# Patient Record
Sex: FEMALE | Race: WHITE | NOT HISPANIC OR LATINO | Employment: UNEMPLOYED | URBAN - METROPOLITAN AREA
[De-identification: names, ages, dates, MRNs, and addresses within clinical notes are randomized per-mention and may not be internally consistent; named-entity substitution may affect disease eponyms.]

---

## 2023-02-28 LAB
BASOPHILS (10*3/UL) IN BLOOD BY AUTOMATED COUNT: 0.09 X10E9/L (ref 0–0.1)
BASOPHILS/100 LEUKOCYTES IN BLOOD BY AUTOMATED COUNT: 2.6 % (ref 0–2)
EOSINOPHILS (10*3/UL) IN BLOOD BY AUTOMATED COUNT: 0.16 X10E9/L (ref 0–0.7)
EOSINOPHILS/100 LEUKOCYTES IN BLOOD BY AUTOMATED COUNT: 4.6 % (ref 0–6)
ERYTHROCYTE DISTRIBUTION WIDTH (RATIO) BY AUTOMATED COUNT: 13.2 % (ref 11.5–14.5)
ERYTHROCYTE MEAN CORPUSCULAR HEMOGLOBIN CONCENTRATION (G/DL) BY AUTOMATED: 33.6 G/DL (ref 32–36)
ERYTHROCYTE MEAN CORPUSCULAR VOLUME (FL) BY AUTOMATED COUNT: 108 FL (ref 80–100)
ERYTHROCYTES (10*6/UL) IN BLOOD BY AUTOMATED COUNT: 4.08 X10E12/L (ref 4–5.2)
HEMATOCRIT (%) IN BLOOD BY AUTOMATED COUNT: 44 % (ref 36–46)
HEMOGLOBIN (G/DL) IN BLOOD: 14.8 G/DL (ref 12–16)
IMMATURE GRANULOCYTES/100 LEUKOCYTES IN BLOOD BY AUTOMATED COUNT: 0.3 % (ref 0–0.9)
LEUKOCYTES (10*3/UL) IN BLOOD BY AUTOMATED COUNT: 3.5 X10E9/L (ref 4.4–11.3)
LYMPHOCYTES (10*3/UL) IN BLOOD BY AUTOMATED COUNT: 1.21 X10E9/L (ref 1.2–4.8)
LYMPHOCYTES/100 LEUKOCYTES IN BLOOD BY AUTOMATED COUNT: 34.8 % (ref 13–44)
MONOCYTES (10*3/UL) IN BLOOD BY AUTOMATED COUNT: 0.43 X10E9/L (ref 0.1–1)
MONOCYTES/100 LEUKOCYTES IN BLOOD BY AUTOMATED COUNT: 12.4 % (ref 2–10)
NEUTROPHILS (10*3/UL) IN BLOOD BY AUTOMATED COUNT: 1.58 X10E9/L (ref 1.2–7.7)
NEUTROPHILS/100 LEUKOCYTES IN BLOOD BY AUTOMATED COUNT: 45.3 % (ref 40–80)
PLATELETS (10*3/UL) IN BLOOD AUTOMATED COUNT: 249 X10E9/L (ref 150–450)

## 2023-10-10 ENCOUNTER — TELEPHONE (OUTPATIENT)
Dept: HEMATOLOGY/ONCOLOGY | Facility: HOSPITAL | Age: 58
End: 2023-10-10
Payer: COMMERCIAL

## 2023-10-10 DIAGNOSIS — D70.0 CONGENITAL NEUTROPENIA (MULTI): Primary | ICD-10-CM

## 2023-10-10 NOTE — TELEPHONE ENCOUNTER
Tutu from [facility] calling regarding Zarxio 480 mcg.  This medication is on  backorder.  Do we have any alternatives.    They suggest Nivestym which is the same strength but her insurance will require prior authorization.

## 2023-10-13 NOTE — TELEPHONE ENCOUNTER
Per provider OK for Neupogen.  Call placed to specialty pharmacy.  Per specialty pharmacy, they can provide Neupogen however will need new orders.  Will forward to provider.

## 2023-10-16 ENCOUNTER — TELEPHONE (OUTPATIENT)
Dept: HEMATOLOGY/ONCOLOGY | Facility: HOSPITAL | Age: 58
End: 2023-10-16
Payer: COMMERCIAL

## 2023-10-18 ENCOUNTER — DOCUMENTATION (OUTPATIENT)
Dept: HEMATOLOGY/ONCOLOGY | Facility: CLINIC | Age: 58
End: 2023-10-18
Payer: COMMERCIAL

## 2023-10-18 DIAGNOSIS — D70.0 CONGENITAL NEUTROPENIA (MULTI): ICD-10-CM

## 2023-10-18 NOTE — TELEPHONE ENCOUNTER
Patient calling [facility] waiting for prescription since Zarxio is back ordered.  Reference [id number] phone [phone]    Covering RN and I looked in chart and don't see prescription was sent.

## 2023-10-20 ENCOUNTER — TELEPHONE (OUTPATIENT)
Dept: HEMATOLOGY/ONCOLOGY | Facility: CLINIC | Age: 58
End: 2023-10-20
Payer: COMMERCIAL

## 2023-10-20 DIAGNOSIS — D70.0 CONGENITAL NEUTROPENIA (MULTI): ICD-10-CM

## 2023-10-20 RX ORDER — FILGRASTIM-AAFI 480 UG/.8ML
480 INJECTION, SOLUTION SUBCUTANEOUS
Qty: 4.8 ML | Refills: 0 | Status: SHIPPED | OUTPATIENT
Start: 2023-10-20 | End: 2023-12-28 | Stop reason: SDUPTHER

## 2023-10-24 ENCOUNTER — TELEPHONE (OUTPATIENT)
Dept: HEMATOLOGY/ONCOLOGY | Facility: CLINIC | Age: 58
End: 2023-10-24
Payer: COMMERCIAL

## 2023-10-26 NOTE — TELEPHONE ENCOUNTER
"Call placed to [facility] x 2 11/25.  Unable to speak to a representative after extended wait time.      Call placed to [facility] 10/26.  Per [facility] need to contact patient's \"plan\" at [phone] to authorize.    "

## 2023-10-26 NOTE — TELEPHONE ENCOUNTER
Approval obtained from patient's insurance company good from 9/26/23 - 4/23/24 ID#[id number].   Call returned again to [facility] to notify.  They confirmed the RX is able to be sent and will send today.  Call returned to patient.  Patient notified of approval.  Patient will call [facility] if not received by tomorrow.  Call back instructions reviewed.  Patient verbalized understanding.

## 2023-11-16 RX ORDER — LEVOTHYROXINE SODIUM 137 UG/1
TABLET ORAL
COMMUNITY
Start: 2023-08-27

## 2023-11-16 RX ORDER — NIRMATRELVIR AND RITONAVIR 300-100 MG
KIT ORAL
COMMUNITY
Start: 2023-09-08

## 2023-11-16 RX ORDER — NEBIVOLOL 10 MG/1
TABLET ORAL
COMMUNITY
Start: 2023-08-27

## 2023-11-16 RX ORDER — COVID-19 ANTIGEN TEST
KIT MISCELLANEOUS
COMMUNITY
Start: 2023-10-13

## 2023-11-16 RX ORDER — NEBIVOLOL HYDROCHLORIDE 5 MG/1
TABLET ORAL
COMMUNITY
Start: 2023-02-06 | End: 2024-02-29 | Stop reason: WASHOUT

## 2023-11-16 RX ORDER — ALBUTEROL SULFATE 90 UG/1
AEROSOL, METERED RESPIRATORY (INHALATION)
COMMUNITY
Start: 2023-08-27

## 2023-11-28 ENCOUNTER — APPOINTMENT (OUTPATIENT)
Dept: LAB | Facility: CLINIC | Age: 58
End: 2023-11-28
Payer: COMMERCIAL

## 2023-11-28 ENCOUNTER — TELEPHONE (OUTPATIENT)
Dept: HEMATOLOGY/ONCOLOGY | Facility: CLINIC | Age: 58
End: 2023-11-28
Payer: COMMERCIAL

## 2023-11-28 ENCOUNTER — OFFICE VISIT (OUTPATIENT)
Dept: HEMATOLOGY/ONCOLOGY | Facility: CLINIC | Age: 58
End: 2023-11-28
Payer: COMMERCIAL

## 2023-11-28 VITALS
TEMPERATURE: 97.3 F | WEIGHT: 193.12 LBS | DIASTOLIC BLOOD PRESSURE: 88 MMHG | HEIGHT: 67 IN | OXYGEN SATURATION: 100 % | HEART RATE: 80 BPM | SYSTOLIC BLOOD PRESSURE: 152 MMHG | BODY MASS INDEX: 30.31 KG/M2

## 2023-11-28 DIAGNOSIS — D70.0 CONGENITAL NEUTROPENIA (MULTI): ICD-10-CM

## 2023-11-28 LAB
BASOPHILS # BLD AUTO: 0.08 X10*3/UL (ref 0–0.1)
BASOPHILS NFR BLD AUTO: 2.3 %
EOSINOPHIL # BLD AUTO: 0.21 X10*3/UL (ref 0–0.7)
EOSINOPHIL NFR BLD AUTO: 6.1 %
ERYTHROCYTE [DISTWIDTH] IN BLOOD BY AUTOMATED COUNT: 12.4 % (ref 11.5–14.5)
HCT VFR BLD AUTO: 45.4 % (ref 36–46)
HGB BLD-MCNC: 15.3 G/DL (ref 12–16)
IMM GRANULOCYTES # BLD AUTO: 0.01 X10*3/UL (ref 0–0.7)
IMM GRANULOCYTES NFR BLD AUTO: 0.3 % (ref 0–0.9)
LYMPHOCYTES # BLD AUTO: 1.33 X10*3/UL (ref 1.2–4.8)
LYMPHOCYTES NFR BLD AUTO: 38.6 %
MCH RBC QN AUTO: 36.1 PG (ref 26–34)
MCHC RBC AUTO-ENTMCNC: 33.7 G/DL (ref 32–36)
MCV RBC AUTO: 107 FL (ref 80–100)
MONOCYTES # BLD AUTO: 0.58 X10*3/UL (ref 0.1–1)
MONOCYTES NFR BLD AUTO: 16.8 %
NEUTROPHILS # BLD AUTO: 1.24 X10*3/UL (ref 1.2–7.7)
NEUTROPHILS NFR BLD AUTO: 35.9 %
NRBC BLD-RTO: ABNORMAL /100{WBCS}
PLATELET # BLD AUTO: 279 X10*3/UL (ref 150–450)
RBC # BLD AUTO: 4.24 X10*6/UL (ref 4–5.2)
WBC # BLD AUTO: 3.5 X10*3/UL (ref 4.4–11.3)

## 2023-11-28 PROCEDURE — 99214 OFFICE O/P EST MOD 30 MIN: CPT | Performed by: INTERNAL MEDICINE

## 2023-11-28 PROCEDURE — 36415 COLL VENOUS BLD VENIPUNCTURE: CPT | Performed by: INTERNAL MEDICINE

## 2023-11-28 PROCEDURE — 85025 COMPLETE CBC W/AUTO DIFF WBC: CPT | Performed by: INTERNAL MEDICINE

## 2023-11-28 ASSESSMENT — ENCOUNTER SYMPTOMS
LOSS OF SENSATION IN FEET: 0
OCCASIONAL FEELINGS OF UNSTEADINESS: 0
DEPRESSION: 0

## 2023-11-28 ASSESSMENT — PATIENT HEALTH QUESTIONNAIRE - PHQ9
SUM OF ALL RESPONSES TO PHQ9 QUESTIONS 1 AND 2: 0
1. LITTLE INTEREST OR PLEASURE IN DOING THINGS: NOT AT ALL
2. FEELING DOWN, DEPRESSED OR HOPELESS: NOT AT ALL

## 2023-11-28 ASSESSMENT — PAIN SCALES - GENERAL: PAINLEVEL: 0-NO PAIN

## 2023-11-28 NOTE — PROGRESS NOTES
Patient for follow up in 12 weeks with labs same day.  Patient to continue self injecting GCSF.  Patient independently ambulatory off unit in NAD and without complaints.  Gait steady.  Call back instructions reviewed.  Patient verbalized understanding.

## 2023-11-28 NOTE — PROGRESS NOTES
Patient ID: Lorena Mojica is a 58 y.o. female.  Referring Physician: No referring provider defined for this encounter.  Primary Care Provider: Matt Collins DO            Patient Instructions:       G-CSF 480mcg SQ Q 2 wks /continue self injection  RTC 12 wks  CBC,         ASSESSMENT, PROBLEM LIST, DECISION MAKING, PLAN.  1.  Chronic idiopathic neutropenia.       a.  Initial diagnosis in 2005. At that time her baseline ANC was around 300,   Extensive workup including bone marrow biopsy x2 all negative.At [facility], bone marrow biopsy results are not available    Patient was getting periodic pneumonia and other infections so she was started on Neupogen every 2 weeks and has remained stable  Patient was evaluated by Ricardo Garcia  at [facility] in November 2016 and has recommended repeat bone marrow aspirate and biopsy  b.  The patient is maintained on chronic Neupogen injection at 2-week interval.     PAST MEDICAL HISTORY:    1.  Chronic idiopathic neutropenia.  2.  Depression.  3.  Graves disease status post radioactive iodine ablation.  4.  Hypothyroidism due to radioactive iodine ablation.  5.  Hypertension.  6.  History of tubal ligation and eye surgeries.         INTERVAL HISTORY:    Patient returns today for follow-up on chronic neutropenia either idiopathic versus related to radiation therapy for hyperthyroidism   Patient was getting periodic pneumonia so she has been started on Neupogen every 2 weeks and has been doing fairly well         PHYSICAL EXAMINATION:    General: Conscious, alert, oriented x3, not in acute distress.  HEENT:    No icterus.    Chest:Bilateral symmetrical,     CVS:  S1, S2.    Abdomen:  Soft, no palpable mass   Extremities: No clubbing, cyanosis,     Skin: No petechial rash.                Assessment, Plan and Orders:       1.  Chronic idiopathic neutropenia.   Patient has been on 480 mcg of Neupogen at 2 weeks' interval. 2 attempts at stopping  Neupogen Or decreasing frequency over the years have resulted in severe drop in ANC and clinical problem with pneumonia and other infections.     Continue G-CSF  every 2 weeks, self injection       pt was adv to call before any invasive procedure such as dental work, colonoscopy or any other procedure and will get additional neupogen to make sure her ANC is >1000    Patient is up-to-date on her preventive vaccination including pneumonia shot shingles shots, flu shot, COVID shots and has been followed by primary care physician for periodic vaccination.        Patient has not had any recent hospital admission or any major infections            LABS:  Office Visit on 11/28/2023   Component Date Value Ref Range Status    WBC 11/28/2023 3.5 (L)  4.4 - 11.3 x10*3/uL Final    nRBC 11/28/2023    Final    RBC 11/28/2023 4.24  4.00 - 5.20 x10*6/uL Final    Hemoglobin 11/28/2023 15.3  12.0 - 16.0 g/dL Final    Hematocrit 11/28/2023 45.4  36.0 - 46.0 % Final    MCV 11/28/2023 107 (H)  80 - 100 fL Final    MCH 11/28/2023 36.1 (H)  26.0 - 34.0 pg Final    MCHC 11/28/2023 33.7  32.0 - 36.0 g/dL Final    RDW 11/28/2023 12.4  11.5 - 14.5 % Final    Platelets 11/28/2023 279  150 - 450 x10*3/uL Final    Neutrophils % 11/28/2023 35.9  40.0 - 80.0 % Final    Immature Granulocytes %, Automated 11/28/2023 0.3  0.0 - 0.9 % Final    Lymphocytes % 11/28/2023 38.6  13.0 - 44.0 % Final    Monocytes % 11/28/2023 16.8  2.0 - 10.0 % Final    Eosinophils % 11/28/2023 6.1  0.0 - 6.0 % Final    Basophils % 11/28/2023 2.3  0.0 - 2.0 % Final    Neutrophils Absolute 11/28/2023 1.24  1.20 - 7.70 x10*3/uL Final    Immature Granulocytes Absolute, Au* 11/28/2023 0.01  0.00 - 0.70 x10*3/uL Final    Lymphocytes Absolute 11/28/2023 1.33  1.20 - 4.80 x10*3/uL Final    Monocytes Absolute 11/28/2023 0.58  0.10 - 1.00 x10*3/uL Final    Eosinophils Absolute 11/28/2023 0.21  0.00 - 0.70 x10*3/uL Final    Basophils Absolute 11/28/2023 0.08  0.00 - 0.10 x10*3/uL  "Final       IMAGING:  No results found.    VITALS: BSA: 2.03 meters squared /88   Pulse 80   Temp 36.3 °C (97.3 °F) (Temporal)   Ht 1.7 m (5' 6.93\")   Wt 87.6 kg (193 lb 2 oz)   SpO2 100%   BMI 30.31 kg/m²     Current Outpatient Medications   Medication Sig Dispense Refill    albuterol 90 mcg/actuation inhaler       Bystolic 5 mg tablet       filgrastim-aafi (Nivestym) 480 mcg/0.8 mL Inject 0.8 mL (480 mcg) under the skin every 14 (fourteen) days for 6 doses. 4.8 mL 0    Flowflex COVID-19 Ag Home Test kit       nebivolol (Bystolic) 10 mg tablet       Paxlovid 300 mg (150 mg x 2)-100 mg tablet therapy pack TAKE 2 TABLETS (NIRMATRELVIR) AND TAKE 1 TABLET (RITONAVIR) BY MOUTH TWICE A DAY FOR 5 DAYS      Synthroid 137 mcg tablet       Zarxio 480 mcg/0.8 mL Inject 0.8 mL (480 mcg) under the skin once daily.       No current facility-administered medications for this visit.       ALLERGY: Patient has no known allergies.    SOCIAL HISTORY: She  has no history on file for alcohol use. She  has no history on file for drug use.    SOCIAL HISTORY:    The patient and her  have been  for 30 years and live in [place], [place].  Nonsmoker.  Rare social alcohol intake.  She is currently on disability.  She used to work as a .       FAMILY  HISTORY:    Father aged 83 has heart problems.  Mother aged 79 is a pancreatic cancer survivor.  3 siblings, 2 children, 2 sons all alive and well.         (1)  Medication reviewed in e-chart  Patient is monitored for medication toxicity  labs reviewed and interpreted independently, X rays independently reviewed  Notes from other physicians involved in care were reviewed    Charting was completed using voice recognition technology and may include unintended errors.    SIDNEY MCNAIR MD, DELMIS.    Reinier Davey Master Clinician in Hematology and Oncology  Medical Director, [facility] at [facility] " [place].  [place] office  Phone [phone]  Fax      [phone]  [facility] /[place].  Phone [phone]  Fax     [phone]

## 2023-11-28 NOTE — TELEPHONE ENCOUNTER
Received message from staff about question regarding assistance with a new medication.  Reached out to patient to clarify and assist.  Reports she had been on Zarxio and had assistance for that.  She was unsure of the name of the new one during the call, but indicated that it was office administered.  SW reviewed that our financial Navigator, Clemencia, may be able to assist if this is office administered.  She reported that she tried to get assistance, but was told the office had to do it.  Upon further review, it is not clear to SW at this time for certain as to home or clinic-administered as it appears [facility] may be filling it.  [employer] does advertise a co-pay card - the office completes forms for reimbursement if this is administered on site, otherwise, there is an option for pharmacy, and an option for the patient to create a portal.  Reached out to both patient and FN and provided info on patient registering for the portal.  SW will await further update from patient.

## 2023-12-28 ENCOUNTER — TELEPHONE (OUTPATIENT)
Dept: HEMATOLOGY/ONCOLOGY | Facility: CLINIC | Age: 58
End: 2023-12-28
Payer: COMMERCIAL

## 2023-12-28 DIAGNOSIS — D70.0 CONGENITAL NEUTROPENIA (MULTI): ICD-10-CM

## 2023-12-28 RX ORDER — FILGRASTIM-AAFI 480 UG/.8ML
480 INJECTION, SOLUTION SUBCUTANEOUS
Qty: 0.8 ML | Refills: 5 | Status: SHIPPED | OUTPATIENT
Start: 2023-12-28 | End: 2024-03-11 | Stop reason: SDUPTHER

## 2023-12-28 NOTE — TELEPHONE ENCOUNTER
[facility] requesting refill on Nivestym 480 mcg/0.8 ml PFS dosage 480 mcg route subcutaneously Frequency Daily Inject 0.8 ML (480 mcg) under the skin every 14 days for 6 doses (discard unused portion)

## 2024-01-22 ENCOUNTER — TELEPHONE (OUTPATIENT)
Dept: HEMATOLOGY/ONCOLOGY | Facility: CLINIC | Age: 59
End: 2024-01-22
Payer: COMMERCIAL

## 2024-01-22 NOTE — TELEPHONE ENCOUNTER
[facility] calling to say they received prescription but are requesting 2 syringes for 28 day supply.      Please call [phone] reference number [id number] for any questions.

## 2024-01-23 NOTE — TELEPHONE ENCOUNTER
Verbal order per Dr. Natalia bauman to dispense 2 syringes for a 28 day supply on Nivestym RX.  Call placed to direct doctor line at [phone], reference #[id number].  Updated RX with Marco Antonio pharmacist for Nivestym 480mcg to inject every 14 days dispense 2 syringes for 28 day supply with 2 refills (total of 3 mos supply).

## 2024-02-29 PROBLEM — J30.1 ALLERGIC RHINITIS DUE TO POLLEN: Status: ACTIVE | Noted: 2021-11-09

## 2024-02-29 PROBLEM — E03.4 ATROPHY OF THYROID (ACQUIRED): Status: ACTIVE | Noted: 2021-11-09

## 2024-02-29 PROBLEM — M25.511 ACUTE PAIN OF RIGHT SHOULDER: Status: ACTIVE | Noted: 2022-04-12
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
## 2024-02-29 PROBLEM — I10 ESSENTIAL (PRIMARY) HYPERTENSION: Status: ACTIVE | Noted: 2021-11-09

## 2024-03-05 ENCOUNTER — OFFICE VISIT (OUTPATIENT)
Dept: HEMATOLOGY/ONCOLOGY | Facility: CLINIC | Age: 59
End: 2024-03-05
Payer: COMMERCIAL

## 2024-03-05 ENCOUNTER — LAB (OUTPATIENT)
Dept: LAB | Facility: CLINIC | Age: 59
End: 2024-03-05
Payer: COMMERCIAL

## 2024-03-05 VITALS
BODY MASS INDEX: 31.19 KG/M2 | OXYGEN SATURATION: 96 % | SYSTOLIC BLOOD PRESSURE: 144 MMHG | WEIGHT: 198.75 LBS | RESPIRATION RATE: 16 BRPM | TEMPERATURE: 97 F | HEART RATE: 81 BPM | DIASTOLIC BLOOD PRESSURE: 89 MMHG

## 2024-03-05 DIAGNOSIS — D70.0 CONGENITAL NEUTROPENIA (MULTI): ICD-10-CM

## 2024-03-05 LAB
BASOPHILS # BLD AUTO: 0.07 X10*3/UL (ref 0–0.1)
BASOPHILS NFR BLD AUTO: 1.3 %
EOSINOPHIL # BLD AUTO: 0.16 X10*3/UL (ref 0–0.7)
EOSINOPHIL NFR BLD AUTO: 3 %
ERYTHROCYTE [DISTWIDTH] IN BLOOD BY AUTOMATED COUNT: 12.5 % (ref 11.5–14.5)
HCT VFR BLD AUTO: 43.2 % (ref 36–46)
HGB BLD-MCNC: 14.8 G/DL (ref 12–16)
IMM GRANULOCYTES # BLD AUTO: 0.04 X10*3/UL (ref 0–0.7)
IMM GRANULOCYTES NFR BLD AUTO: 0.7 % (ref 0–0.9)
LYMPHOCYTES # BLD AUTO: 1.55 X10*3/UL (ref 1.2–4.8)
LYMPHOCYTES NFR BLD AUTO: 29 %
MCH RBC QN AUTO: 36.8 PG (ref 26–34)
MCHC RBC AUTO-ENTMCNC: 34.3 G/DL (ref 32–36)
MCV RBC AUTO: 108 FL (ref 80–100)
MONOCYTES # BLD AUTO: 0.76 X10*3/UL (ref 0.1–1)
MONOCYTES NFR BLD AUTO: 14.2 %
NEUTROPHILS # BLD AUTO: 2.76 X10*3/UL (ref 1.2–7.7)
NEUTROPHILS NFR BLD AUTO: 51.8 %
NRBC BLD-RTO: ABNORMAL /100{WBCS}
PLATELET # BLD AUTO: 245 X10*3/UL (ref 150–450)
RBC # BLD AUTO: 4.02 X10*6/UL (ref 4–5.2)
WBC # BLD AUTO: 5.3 X10*3/UL (ref 4.4–11.3)

## 2024-03-05 PROCEDURE — 85025 COMPLETE CBC W/AUTO DIFF WBC: CPT

## 2024-03-05 PROCEDURE — 3077F SYST BP >= 140 MM HG: CPT | Performed by: INTERNAL MEDICINE

## 2024-03-05 PROCEDURE — 3079F DIAST BP 80-89 MM HG: CPT | Performed by: INTERNAL MEDICINE

## 2024-03-05 PROCEDURE — 99214 OFFICE O/P EST MOD 30 MIN: CPT | Performed by: INTERNAL MEDICINE

## 2024-03-05 PROCEDURE — 36415 COLL VENOUS BLD VENIPUNCTURE: CPT

## 2024-03-05 NOTE — PROGRESS NOTES
Patient ID: Lorena Mojica is a 58 y.o. female.  Referring Physician: Joao Fisher MD  [address]  [place] [address]  Primary Care Provider: Matt Collins DO    ORDERS & PATIENT INSTRUCTIONS:      G-CSF 480mcg SQ Q 2 wks /continue self injection  RTC 12 wks  CBC,         ASSESSMENT, PROBLEM LIST, DECISION MAKING, PLAN.  1.  Chronic idiopathic neutropenia.       a.  Initial diagnosis in 2005. At that time her baseline ANC was around 300,   Extensive workup including bone marrow biopsy x2 all negative.At [facility], bone marrow biopsy results are not available    Patient was getting periodic pneumonia and other infections so she was started on Neupogen every 2 weeks and has remained stable  Patient was evaluated by Ricardo Garcia  at [facility] in November 2016 and has recommended repeat bone marrow aspirate and biopsy  b.  The patient is maintained on chronic Neupogen injection at 2-week interval.     PAST MEDICAL HISTORY:    1.  Chronic idiopathic neutropenia.  2.  Depression.  3.  Graves disease status post radioactive iodine ablation.  4.  Hypothyroidism due to radioactive iodine ablation.  5.  Hypertension.  6.  History of tubal ligation and eye surgeries.         INTERVAL HISTORY:    Patient returns today for follow-up on chronic neutropenia either idiopathic versus related to radiation therapy for hyperthyroidism   Patient was getting periodic pneumonia so she has been started on Neupogen every 2 weeks and has been doing fairly well   Patient has not had any recent infection      PHYSICAL EXAMINATION:    General: Conscious, alert, oriented x3, not in acute distress.  HEENT:    No icterus.    Chest:Bilateral symmetrical,     CVS:  S1, S2.    Abdomen:  Soft, no palpable mass   Extremities: No clubbing, cyanosis,     Skin: No petechial rash.                Assessment, Plan and Orders:       1.  Chronic idiopathic neutropenia.   Patient has  "been on 480 mcg of Neupogen at 2 weeks' interval. 2 attempts at stopping Neupogen Or decreasing frequency over the years have resulted in severe drop in ANC and clinical problem with pneumonia and other infections.     Continue G-CSF  every 2 weeks, self injection       pt was adv to call before any invasive procedure such as dental work, colonoscopy or any other procedure and will get additional neupogen to make sure her ANC is >1000    Patient is up-to-date on her preventive vaccination including pneumonia shot shingles shots, flu shot, COVID shots and has been followed by primary care physician for periodic vaccination.        Patient has not had any recent hospital admission or any major infections            VITALS:   2.06 meters squared /89   Pulse 81   Temp 36.1 °C (97 °F)   Resp 16   Wt 90.2 kg (198 lb 11.9 oz)   SpO2 96%   BMI 31.19 kg/m²     LABS:    CBC:  Recent Labs     03/05/24  1335 11/28/23  1508 08/29/23  1030 05/30/23  0956 02/28/23  1031   WBC 5.3 3.5* 3.7* 2.3* CANCELED   HGB 14.8 15.3 14.0 13.7 CANCELED   HCT 43.2 45.4 41.0 41.2 CANCELED    279 247 219 CANCELED   * 107* 107* 109* CANCELED       CMP:  Recent Labs     08/25/20  1000 04/17/18  1000 01/16/18  1300 10/10/17  1310   * 141 139 140   K 4.3 4.2 4.2 4.2    106 106 108*   CO2 22 25 25 23   ANIONGAP 15 14 12 13   BUN 10 6 15 14   CREATININE 0.77 0.75 0.90 0.85     Recent Labs     08/25/20  1000 04/17/18  1000 01/16/18  1300 10/10/17  1310   ALBUMIN 4.0 4.1 4.5 4.3   ALKPHOS 63 74 65 65   ALT 34 26 16 13   AST 36 31 19 19   BILITOT 0.6 0.6 0.8 0.6       HEME/ENDO:No results for input(s): \"FERRITIN\", \"IRONSAT\", \"TSH\", \"GPQFEDXA40\", \"FOLATE\" in the last 97598 hours.     MICRO: No results for input(s): \"ESR\", \"CRP\", \"PROCAL\" in the last 53935 hours.  No results found for the last 90 days.        TUMOR MARKERS:  No results found for: \"LABCA2\", \"CEA\", \"\", \"PSA\", \"AFPTM\", \"HCGTM\", \"\" "             IMAGING:         No image results found.       Current Outpatient Medications   Medication Sig Dispense Refill    albuterol 90 mcg/actuation inhaler       filgrastim-aafi (Nivestym) 480 mcg/0.8 mL Inject 0.8 mL (480 mcg) under the skin every 14 (fourteen) days for 6 doses. 0.8 mL 5    nebivolol (Bystolic) 10 mg tablet       Synthroid 137 mcg tablet       Flowflex COVID-19 Ag Home Test kit       Paxlovid 300 mg (150 mg x 2)-100 mg tablet therapy pack TAKE 2 TABLETS (NIRMATRELVIR) AND TAKE 1 TABLET (RITONAVIR) BY MOUTH TWICE A DAY FOR 5 DAYS       No current facility-administered medications for this visit.                  SOCIAL HISTORY:    has no history on file for alcohol use.   has no history on file for drug use.,   Tobacco Use: Not on file       FAMILY HISTORY:  No family history on file.    ALLERGY:   Patient has no known allergies.        SOCIAL HISTORY:    The patient and her  have been  for 30 years and live in [place], [place].  Nonsmoker.  Rare social alcohol intake.  She is currently on disability.  She used to work as a .       FAMILY  HISTORY:    Father aged 83 has heart problems.  Mother aged 79 is a pancreatic cancer survivor.  3 siblings, 2 children, 2 sons all alive and well.        Medication reviewed in e-chart  Patient is monitored for medication toxicity  labs reviewed and interpreted independently, X rays independently reviewed  Notes from other physicians involved in care were reviewed    Charting was completed using voice recognition technology and may include unintended errors.    SIDNEY MCNAIR MD, DELMIS.    Reinier Davey Master Clinician in Hematology and Oncology  Medical Director, [facility] at [facility].  [place] office  Phone [phone]  Fax      [phone]  [facility] /[place].  Phone [phone]  Fax     [phone]

## 2024-03-05 NOTE — PROGRESS NOTES
Neupogen to continue as ordered.  No refills needed at this time.  Call back instructions reviewed.  Patient verbalized understanding.

## 2024-03-11 ENCOUNTER — TELEPHONE (OUTPATIENT)
Dept: HEMATOLOGY/ONCOLOGY | Facility: CLINIC | Age: 59
End: 2024-03-11
Payer: COMMERCIAL

## 2024-03-11 DIAGNOSIS — D70.0 CONGENITAL NEUTROPENIA (MULTI): ICD-10-CM

## 2024-03-11 NOTE — TELEPHONE ENCOUNTER
Dr. Fisher patient. Patient called needs a refill of her injection Nivestym 480mcg. [facility]

## 2024-03-12 RX ORDER — FILGRASTIM-AAFI 480 UG/.8ML
480 INJECTION, SOLUTION SUBCUTANEOUS
Qty: 0.8 ML | Refills: 5 | Status: SHIPPED | OUTPATIENT
Start: 2024-03-12 | End: 2024-05-22

## 2024-04-11 ENCOUNTER — TELEPHONE (OUTPATIENT)
Dept: HEMATOLOGY/ONCOLOGY | Facility: CLINIC | Age: 59
End: 2024-04-11
Payer: COMMERCIAL

## 2024-04-11 PROBLEM — D70.0 CONGENITAL NEUTROPENIA (MULTI): Status: ACTIVE | Noted: 2023-11-28

## 2024-04-11 NOTE — TELEPHONE ENCOUNTER
Call returned to [facility].  Pharmacist states patient requesting 2 syringes (28 day dosing vs 14 day) to avoid awaiting delivery Q 2 weeks.  Verbal order provided to pharmacist.

## 2024-04-26 ENCOUNTER — TELEPHONE (OUTPATIENT)
Dept: HEMATOLOGY/ONCOLOGY | Facility: CLINIC | Age: 59
End: 2024-04-26
Payer: COMMERCIAL

## 2024-04-26 NOTE — TELEPHONE ENCOUNTER
Fax received from [employer] stating response not received in regards to completion of coverage review process.  No fax or call recently received.  No questionnaire received.  Call placed by RN to [employer] [phone].  STAT prior authorization initiated again as Rep states it is currently denied.  Per Rep PA will only approve 30 day supply at a time.   Approval obtained [id number] valid 3/27/24 - 6/23/24.

## 2024-06-11 ENCOUNTER — APPOINTMENT (OUTPATIENT)
Dept: HEMATOLOGY/ONCOLOGY | Facility: CLINIC | Age: 59
End: 2024-06-11
Payer: COMMERCIAL

## 2024-06-25 ENCOUNTER — TELEPHONE (OUTPATIENT)
Dept: HEMATOLOGY/ONCOLOGY | Facility: CLINIC | Age: 59
End: 2024-06-25
Payer: COMMERCIAL

## 2024-06-25 DIAGNOSIS — D70.0 CONGENITAL NEUTROPENIA (MULTI): Primary | ICD-10-CM

## 2024-06-25 RX ORDER — TBO-FILGRASTIM 480 UG/.8ML
480 INJECTION, SOLUTION SUBCUTANEOUS
Qty: 6 ML | Refills: 3 | Status: SHIPPED | OUTPATIENT
Start: 2024-06-25

## 2024-06-25 NOTE — TELEPHONE ENCOUNTER
[facility] requesting refill on Nivestym 480 mcg/0.8 ml pfs dosage 480 mcg route subcutaneously frequency daily label instructions:  Inject 0.8 ml (480 mcg) under the skin every 14 days for 6 doses (discard unused portion) please include syringes.

## 2024-07-02 ENCOUNTER — TELEPHONE (OUTPATIENT)
Dept: HEMATOLOGY/ONCOLOGY | Facility: CLINIC | Age: 59
End: 2024-07-02
Payer: COMMERCIAL

## 2024-07-02 DIAGNOSIS — D70.0 CONGENITAL NEUTROPENIA (MULTI): ICD-10-CM

## 2024-07-02 RX ORDER — FILGRASTIM-AAFI 480 UG/.8ML
480 INJECTION, SOLUTION SUBCUTANEOUS
Qty: 0.8 ML | Refills: 11 | Status: SHIPPED | OUTPATIENT
Start: 2024-07-02 | End: 2025-07-02

## 2024-07-02 NOTE — TELEPHONE ENCOUNTER
Patient needs nivestym refilled.  Spoke with Jose and they have a new prescription for Granix but not enough information to fill..  Please call [facility].    Patient calling again and states that insurance doesn't cover Granix so needs prescription for nivestym.

## 2024-07-05 ENCOUNTER — TELEPHONE (OUTPATIENT)
Dept: HEMATOLOGY/ONCOLOGY | Facility: CLINIC | Age: 59
End: 2024-07-05
Payer: COMMERCIAL

## 2024-07-05 NOTE — TELEPHONE ENCOUNTER
Dr. Fisher patient. Jenny from [facility]  called needs a call back on clarification of medication Filgrastim 480 mcg injection. Please call @ [phone] Ref# [id number]    Called and spoke with Emily Anderson, pharmacist.  Calling about Granix script and d/w her that that script should be cancelled and to please use Nivestym script.  Per Emily, script states one syringe to be delivered at a time but she can amend the script and send two syringes out at a time. Since patient on this med for a while, two syringes to be sent out at a time arranged.  They will contact patient for pickup/delivery.

## 2024-07-15 ENCOUNTER — TELEPHONE (OUTPATIENT)
Dept: HEMATOLOGY/ONCOLOGY | Facility: CLINIC | Age: 59
End: 2024-07-15
Payer: COMMERCIAL

## 2024-07-15 NOTE — TELEPHONE ENCOUNTER
Dr. Fisher patient. Patient called regarding her Tbo-Filgrastim injectio. [facility] used to send her 2 shots and now only getting one. Patient would like to have the order changed to get two shots per delivery again so it saves waste on the packaging. Any questions can call her @ [phone]

## 2024-07-15 NOTE — TELEPHONE ENCOUNTER
Reviewed patient chart.  On July 5th this RN has spoken with pharmacy [facility] and arranged for patient to receive two syringes with each delivery.  Called [facility] today and spoke with Ana, pharmacist.  She reviewed patient order/file and admits to one syringe being sent when script is for two syringes with 11 refills. She also believes that there may be a billing error. She is escalating to have billing corrected and she wants to have two syringes sent out next time instead of sending one out now and then two for following month.  She is unsure why this happened.  She will follow up with our office once she has an answer and plan.  Patient notified via voicemail on identifiable voicemail.

## 2024-08-16 ENCOUNTER — OFFICE VISIT (OUTPATIENT)
Dept: HEMATOLOGY/ONCOLOGY | Facility: CLINIC | Age: 59
End: 2024-08-16
Payer: COMMERCIAL

## 2024-08-16 ENCOUNTER — APPOINTMENT (OUTPATIENT)
Dept: LAB | Facility: CLINIC | Age: 59
End: 2024-08-16
Payer: COMMERCIAL

## 2024-08-16 VITALS
HEART RATE: 90 BPM | DIASTOLIC BLOOD PRESSURE: 105 MMHG | SYSTOLIC BLOOD PRESSURE: 164 MMHG | TEMPERATURE: 96.8 F | RESPIRATION RATE: 16 BRPM | WEIGHT: 185.63 LBS | OXYGEN SATURATION: 97 % | BODY MASS INDEX: 29.14 KG/M2

## 2024-08-16 DIAGNOSIS — D70.0 CONGENITAL NEUTROPENIA (MULTI): ICD-10-CM

## 2024-08-16 LAB
BASOPHILS # BLD AUTO: 0.06 X10*3/UL (ref 0–0.1)
BASOPHILS NFR BLD AUTO: 1.3 %
EOSINOPHIL # BLD AUTO: 0.12 X10*3/UL (ref 0–0.7)
EOSINOPHIL NFR BLD AUTO: 2.6 %
ERYTHROCYTE [DISTWIDTH] IN BLOOD BY AUTOMATED COUNT: 13 % (ref 11.5–14.5)
HCT VFR BLD AUTO: 44.9 % (ref 36–46)
HGB BLD-MCNC: 15.1 G/DL (ref 12–16)
IMM GRANULOCYTES # BLD AUTO: 0.01 X10*3/UL (ref 0–0.7)
IMM GRANULOCYTES NFR BLD AUTO: 0.2 % (ref 0–0.9)
LYMPHOCYTES # BLD AUTO: 0.99 X10*3/UL (ref 1.2–4.8)
LYMPHOCYTES NFR BLD AUTO: 21.2 %
MCH RBC QN AUTO: 37.7 PG (ref 26–34)
MCHC RBC AUTO-ENTMCNC: 33.6 G/DL (ref 32–36)
MCV RBC AUTO: 112 FL (ref 80–100)
MONOCYTES # BLD AUTO: 0.54 X10*3/UL (ref 0.1–1)
MONOCYTES NFR BLD AUTO: 11.5 %
NEUTROPHILS # BLD AUTO: 2.96 X10*3/UL (ref 1.2–7.7)
NEUTROPHILS NFR BLD AUTO: 63.2 %
NRBC BLD-RTO: ABNORMAL /100{WBCS}
PLATELET # BLD AUTO: 215 X10*3/UL (ref 150–450)
RBC # BLD AUTO: 4.01 X10*6/UL (ref 4–5.2)
WBC # BLD AUTO: 4.7 X10*3/UL (ref 4.4–11.3)

## 2024-08-16 PROCEDURE — G2211 COMPLEX E/M VISIT ADD ON: HCPCS | Performed by: INTERNAL MEDICINE

## 2024-08-16 PROCEDURE — 3080F DIAST BP >= 90 MM HG: CPT | Performed by: INTERNAL MEDICINE

## 2024-08-16 PROCEDURE — 36415 COLL VENOUS BLD VENIPUNCTURE: CPT | Performed by: INTERNAL MEDICINE

## 2024-08-16 PROCEDURE — 85025 COMPLETE CBC W/AUTO DIFF WBC: CPT | Performed by: INTERNAL MEDICINE

## 2024-08-16 PROCEDURE — 99214 OFFICE O/P EST MOD 30 MIN: CPT | Performed by: INTERNAL MEDICINE

## 2024-08-16 PROCEDURE — 3077F SYST BP >= 140 MM HG: CPT | Performed by: INTERNAL MEDICINE

## 2024-08-16 ASSESSMENT — PAIN SCALES - GENERAL: PAINLEVEL: 0-NO PAIN

## 2024-08-16 NOTE — PROGRESS NOTES
Patient ID: Lorena Mojica is a 59 y.o. female.  Referring Physician: Joao Fisher MD  [address] [facility], [address]  [place] [address]  Primary Care Provider: Matt Collins DO    ORDERS & PATIENT INSTRUCTIONS:      G-CSF 480mcg SQ Q 2 wks /continue self injection  RTC 12 wks  CBC,         ASSESSMENT, PROBLEM LIST, DECISION MAKING, PLAN.  1.  Chronic idiopathic neutropenia.       a.  Initial diagnosis in 2005. At that time her baseline ANC was around 300,   Extensive workup including bone marrow biopsy x2 all negative.At [facility], bone marrow biopsy results are not available    Patient was getting periodic pneumonia and other infections so she was started on Neupogen every 2 weeks and has remained stable  Patient was evaluated by Ricardo Garcia  at [facility] in November 2016 and has recommended repeat bone marrow aspirate and biopsy  b.  The patient is maintained on chronic Neupogen injection at 2-week interval.     PAST MEDICAL HISTORY:    1.  Chronic idiopathic neutropenia.  2.  Depression.  3.  Graves disease status post radioactive iodine ablation.  4.  Hypothyroidism due to radioactive iodine ablation.  5.  Hypertension.  6.  History of tubal ligation and eye surgeries.         INTERVAL HISTORY:    Patient returns today for follow-up on chronic neutropenia either idiopathic versus related to radiation therapy for hyperthyroidism   Patient was getting periodic pneumonia so she has been started on Neupogen every 2 weeks and has been doing fairly well   Patient has not had any recent infection      PHYSICAL EXAMINATION:    General: Conscious, alert, oriented x3, not in acute distress.  HEENT:    No icterus.    Chest:Bilateral symmetrical,     CVS:  S1, S2.    Abdomen:  Soft, no palpable mass   Extremities: No clubbing, cyanosis,     Skin: No petechial rash.                Assessment, Plan and Orders:       1.  Chronic idiopathic neutropenia.   Patient has  "been on 480 mcg of Neupogen at 2 weeks' interval. 2 attempts at stopping Neupogen Or decreasing frequency over the years have resulted in severe drop in ANC and clinical problem with pneumonia and other infections.     Continue G-CSF  every 2 weeks, self injection       pt was adv to call before any invasive procedure such as dental work, colonoscopy or any other procedure and will get additional neupogen to make sure her ANC is >1000    Patient is up-to-date on her preventive vaccination including pneumonia shot shingles shots, flu shot, COVID shots and has been followed by primary care physician for periodic vaccination.        Patient has not had any recent hospital admission or any major infections            VITALS:   1.99 meters squared BP (!) 164/105   Pulse 90   Temp 36 °C (96.8 °F)   Resp 16   Wt 84.2 kg (185 lb 10 oz)   SpO2 97%   BMI 29.14 kg/m²     LABS:    CBC:  Recent Labs     08/16/24  0905 03/05/24  1335 11/28/23  1508 08/29/23  1030 05/30/23  0956   WBC 4.7 5.3 3.5* 3.7* 2.3*   HGB 15.1 14.8 15.3 14.0 13.7   HCT 44.9 43.2 45.4 41.0 41.2    245 279 247 219   * 108* 107* 107* 109*       CMP:  Recent Labs     08/25/20  1000 04/17/18  1000 01/16/18  1300 10/10/17  1310   * 141 139 140   K 4.3 4.2 4.2 4.2    106 106 108*   CO2 22 25 25 23   ANIONGAP 15 14 12 13   BUN 10 6 15 14   CREATININE 0.77 0.75 0.90 0.85     Recent Labs     08/25/20  1000 04/17/18  1000 01/16/18  1300 10/10/17  1310   ALBUMIN 4.0 4.1 4.5 4.3   ALKPHOS 63 74 65 65   ALT 34 26 16 13   AST 36 31 19 19   BILITOT 0.6 0.6 0.8 0.6       HEME/ENDO:No results for input(s): \"FERRITIN\", \"IRONSAT\", \"TSH\", \"URXWZCVJ99\", \"FOLATE\" in the last 25613 hours.     MICRO: No results for input(s): \"ESR\", \"CRP\", \"PROCAL\" in the last 68280 hours.  No results found for the last 90 days.        TUMOR MARKERS:  No results found for: \"LABCA2\", \"CEA\", \"\", \"PSA\", \"AFPTM\", \"HCGTM\", \"\"             IMAGING:         No " image results found.       Current Outpatient Medications   Medication Sig Dispense Refill    albuterol 90 mcg/actuation inhaler       filgrastim-aafi (Nivestym) 480 mcg/0.8 mL Inject 0.8 mL (480 mcg) under the skin every 14 (fourteen) days. 0.8 mL 11    nebivolol (Bystolic) 10 mg tablet       Synthroid 137 mcg tablet       Flowflex COVID-19 Ag Home Test kit       Paxlovid 300 mg (150 mg x 2)-100 mg tablet therapy pack TAKE 2 TABLETS (NIRMATRELVIR) AND TAKE 1 TABLET (RITONAVIR) BY MOUTH TWICE A DAY FOR 5 DAYS      tbo-filgrastim (Granix) 480 mcg/0.8 mL injection Inject 0.8 mL (480 mcg) under the skin every 14 (fourteen) days. Once every 2 weeks (Patient not taking: Reported on 8/16/2024) 6 mL 3     No current facility-administered medications for this visit.                  SOCIAL HISTORY:    has no history on file for alcohol use.   has no history on file for drug use.,   Tobacco Use: Low Risk  (5/21/2024)    Received from [facility]    Patient History     Smoking Tobacco Use: Never     Smokeless Tobacco Use: Never     Passive Exposure: Not on file       FAMILY HISTORY:  No family history on file.    ALLERGY:   Patient has no known allergies.        SOCIAL HISTORY:    The patient and her  have been  for 30 years and live in [place], [place].  Nonsmoker.  Rare social alcohol intake.  She is currently on disability.  She used to work as a .       FAMILY  HISTORY:    Father aged 83 has heart problems.  Mother aged 79 is a pancreatic cancer survivor.  3 siblings, 2 children, 2 sons all alive and well.        Medication reviewed in e-chart  Patient is monitored for medication toxicity  labs reviewed and interpreted independently, X rays independently reviewed  Notes from other physicians involved in care were reviewed    Charting was completed using voice recognition technology and may include unintended errors.    SIDNEY MCNAIR MD, DELMIS.    Reinier Cummings  Clinician in Hematology and Oncology  Medical Director, [facility] at [facility].  [place] office  Phone [phone]  Fax      [phone]  [facility] /[place].  Phone [phone]  Fax     [phone]

## 2024-08-16 NOTE — PATIENT INSTRUCTIONS
ORDERS & PATIENT INSTRUCTIONS:        G-CSF 480mcg SQ Q 2 wks /continue self injection  RTC 12 wks  CBC,

## 2024-08-16 NOTE — PROGRESS NOTES
Nivestym to continue Q 2 weeks.  Follow-up in 3 months with labs same day.  Call back instructions reviewed.  Patient verbalized understanding.

## 2024-10-21 ENCOUNTER — DOCUMENTATION (OUTPATIENT)
Dept: HEMATOLOGY/ONCOLOGY | Facility: CLINIC | Age: 59
End: 2024-10-21
Payer: COMMERCIAL

## 2024-10-25 ENCOUNTER — LAB (OUTPATIENT)
Dept: LAB | Facility: CLINIC | Age: 59
End: 2024-10-25
Payer: COMMERCIAL

## 2024-10-25 ENCOUNTER — OFFICE VISIT (OUTPATIENT)
Dept: HEMATOLOGY/ONCOLOGY | Facility: CLINIC | Age: 59
End: 2024-10-25
Payer: COMMERCIAL

## 2024-10-25 ENCOUNTER — APPOINTMENT (OUTPATIENT)
Dept: LAB | Facility: CLINIC | Age: 59
End: 2024-10-25
Payer: COMMERCIAL

## 2024-10-25 VITALS
RESPIRATION RATE: 16 BRPM | DIASTOLIC BLOOD PRESSURE: 102 MMHG | HEART RATE: 78 BPM | WEIGHT: 178.57 LBS | OXYGEN SATURATION: 99 % | BODY MASS INDEX: 28.03 KG/M2 | TEMPERATURE: 96.8 F | SYSTOLIC BLOOD PRESSURE: 169 MMHG

## 2024-10-25 DIAGNOSIS — D70.0 CONGENITAL NEUTROPENIA (MULTI): ICD-10-CM

## 2024-10-25 LAB
BASOPHILS # BLD AUTO: 0.08 X10*3/UL (ref 0–0.1)
BASOPHILS NFR BLD AUTO: 2.2 %
EOSINOPHIL # BLD AUTO: 0.15 X10*3/UL (ref 0–0.7)
EOSINOPHIL NFR BLD AUTO: 4.1 %
ERYTHROCYTE [DISTWIDTH] IN BLOOD BY AUTOMATED COUNT: 12.2 % (ref 11.5–14.5)
HCT VFR BLD AUTO: 45.7 % (ref 36–46)
HGB BLD-MCNC: 15.9 G/DL (ref 12–16)
IMM GRANULOCYTES # BLD AUTO: 0.01 X10*3/UL (ref 0–0.7)
IMM GRANULOCYTES NFR BLD AUTO: 0.3 % (ref 0–0.9)
LYMPHOCYTES # BLD AUTO: 0.9 X10*3/UL (ref 1.2–4.8)
LYMPHOCYTES NFR BLD AUTO: 24.7 %
MCH RBC QN AUTO: 37.7 PG (ref 26–34)
MCHC RBC AUTO-ENTMCNC: 34.8 G/DL (ref 32–36)
MCV RBC AUTO: 108 FL (ref 80–100)
MONOCYTES # BLD AUTO: 0.34 X10*3/UL (ref 0.1–1)
MONOCYTES NFR BLD AUTO: 9.3 %
NEUTROPHILS # BLD AUTO: 2.17 X10*3/UL (ref 1.2–7.7)
NEUTROPHILS NFR BLD AUTO: 59.4 %
NRBC BLD-RTO: ABNORMAL /100{WBCS}
PLATELET # BLD AUTO: 233 X10*3/UL (ref 150–450)
RBC # BLD AUTO: 4.22 X10*6/UL (ref 4–5.2)
WBC # BLD AUTO: 3.7 X10*3/UL (ref 4.4–11.3)

## 2024-10-25 PROCEDURE — 85025 COMPLETE CBC W/AUTO DIFF WBC: CPT

## 2024-10-25 PROCEDURE — 99214 OFFICE O/P EST MOD 30 MIN: CPT | Performed by: INTERNAL MEDICINE

## 2024-10-25 PROCEDURE — G2211 COMPLEX E/M VISIT ADD ON: HCPCS | Performed by: INTERNAL MEDICINE

## 2024-10-25 PROCEDURE — 3080F DIAST BP >= 90 MM HG: CPT | Performed by: INTERNAL MEDICINE

## 2024-10-25 PROCEDURE — 3077F SYST BP >= 140 MM HG: CPT | Performed by: INTERNAL MEDICINE

## 2024-10-25 PROCEDURE — 36415 COLL VENOUS BLD VENIPUNCTURE: CPT

## 2024-10-25 ASSESSMENT — PAIN SCALES - GENERAL: PAINLEVEL_OUTOF10: 0-NO PAIN

## 2024-10-25 NOTE — PROGRESS NOTES
Patient ID: Lorena Mojica is a 59 y.o. female.  Referring Physician: Joao Fisher MD  [address] [facility], [address]  [place] [address]  Primary Care Provider: Matt Collins DO    ORDERS & PATIENT INSTRUCTIONS:      G-CSF 480mcg SQ Q 2 wks /continue self injection  RTC 12 wks  CBC,         ASSESSMENT, PROBLEM LIST, DECISION MAKING, PLAN.  1.  Chronic idiopathic neutropenia.       a.  Initial diagnosis in 2005. At that time her baseline ANC was around 300,   Extensive workup including bone marrow biopsy x2 all negative.At [facility], bone marrow biopsy results are not available    Patient was getting periodic pneumonia and other infections so she was started on Neupogen every 2 weeks and has remained stable  Patient was evaluated by Ricardo Gacria  at [facility] in November 2016 and has recommended repeat bone marrow aspirate and biopsy  b.  The patient is maintained on chronic Neupogen injection at 2-week interval.     PAST MEDICAL HISTORY:    1.  Chronic idiopathic neutropenia.  2.  Depression.  3.  Graves disease status post radioactive iodine ablation.  4.  Hypothyroidism due to radioactive iodine ablation.  5.  Hypertension.  6.  History of tubal ligation and eye surgeries.         INTERVAL HISTORY:    Patient returns today for follow-up on chronic neutropenia either idiopathic versus related to radiation therapy for hyperthyroidism   Patient was getting periodic pneumonia so she has been started on Neupogen every 2 weeks and has been doing fairly well   Patient has not had any recent infection      PHYSICAL EXAMINATION:    General: Conscious, alert, oriented x3, not in acute distress.  HEENT:    No icterus.    Chest:Bilateral symmetrical,     CVS:  S1, S2.    Abdomen:  Soft, no palpable mass   Extremities: No clubbing, cyanosis,     Skin: No petechial rash.                Assessment, Plan and Orders:       1.  Chronic idiopathic neutropenia.   Patient has  "been on 480 mcg of Neupogen at 2 weeks' interval. 2 attempts at stopping Neupogen Or decreasing frequency over the years have resulted in severe drop in ANC and clinical problem with pneumonia and other infections.     Continue G-CSF  every 2 weeks, self injection       pt was adv to call before any invasive procedure such as dental work, colonoscopy or any other procedure and will get additional neupogen to make sure her ANC is >1000    Patient is up-to-date on her preventive vaccination including pneumonia shot shingles shots, flu shot, COVID shots and has been followed by primary care physician for periodic vaccination.        Patient has not had any recent hospital admission or any major infections            VITALS:   1.96 meters squared BP (!) 169/102   Pulse 78   Temp 36 °C (96.8 °F) (Temporal)   Resp 16   Wt 81 kg (178 lb 9.2 oz)   SpO2 99%   BMI 28.03 kg/m²     LABS:    CBC:  Recent Labs     10/25/24  0937 08/16/24  0905 03/05/24  1335 11/28/23  1508 08/29/23  1030   WBC 3.7* 4.7 5.3 3.5* 3.7*   HGB 15.9 15.1 14.8 15.3 14.0   HCT 45.7 44.9 43.2 45.4 41.0    215 245 279 247   * 112* 108* 107* 107*       CMP:  Recent Labs     08/25/20  1000 04/17/18  1000 01/16/18  1300 10/10/17  1310   * 141 139 140   K 4.3 4.2 4.2 4.2    106 106 108*   CO2 22 25 25 23   ANIONGAP 15 14 12 13   BUN 10 6 15 14   CREATININE 0.77 0.75 0.90 0.85     Recent Labs     08/25/20  1000 04/17/18  1000 01/16/18  1300 10/10/17  1310   ALBUMIN 4.0 4.1 4.5 4.3   ALKPHOS 63 74 65 65   ALT 34 26 16 13   AST 36 31 19 19   BILITOT 0.6 0.6 0.8 0.6       HEME/ENDO:No results for input(s): \"FERRITIN\", \"IRONSAT\", \"TSH\", \"[id number]\", \"FOLATE\" in the last 81443 hours.     MICRO: No results for input(s): \"ESR\", \"CRP\", \"PROCAL\" in the last 87872 hours.  No results found for the last 90 days.        TUMOR MARKERS:  No results found for: \"LABCA2\", \"CEA\", \"\", \"PSA\", \"AFPTM\", \"HCGTM\", \"\"             IMAGING:     "     No image results found.       Current Outpatient Medications   Medication Sig Dispense Refill    albuterol 90 mcg/actuation inhaler       filgrastim-aafi (Nivestym) 480 mcg/0.8 mL Inject 0.8 mL (480 mcg) under the skin every 14 (fourteen) days. 0.8 mL 11    Flowflex COVID-19 Ag Home Test kit       nebivolol (Bystolic) 10 mg tablet       Paxlovid 300 mg (150 mg x 2)-100 mg tablet therapy pack TAKE 2 TABLETS (NIRMATRELVIR) AND TAKE 1 TABLET (RITONAVIR) BY MOUTH TWICE A DAY FOR 5 DAYS      Synthroid 137 mcg tablet       tbo-filgrastim (Granix) 480 mcg/0.8 mL injection Inject 0.8 mL (480 mcg) under the skin every 14 (fourteen) days. Once every 2 weeks 6 mL 3     No current facility-administered medications for this visit.                  SOCIAL HISTORY:    has no history on file for alcohol use.   has no history on file for drug use.,   Tobacco Use: Low Risk  (5/21/2024)    Received from [facility]    Patient History     Smoking Tobacco Use: Never     Smokeless Tobacco Use: Never     Passive Exposure: Not on file       FAMILY HISTORY:  No family history on file.    ALLERGY:   Patient has no known allergies.        SOCIAL HISTORY:    The patient and her  have been  for 30 years and live in [place], [place].  Nonsmoker.  Rare social alcohol intake.  She is currently on disability.  She used to work as a .       FAMILY  HISTORY:    Father aged 83 has heart problems.  Mother aged 79 is a pancreatic cancer survivor.  3 siblings, 2 children, 2 sons all alive and well.        Medication reviewed in e-chart  Patient is monitored for medication toxicity  labs reviewed and interpreted independently, X rays independently reviewed  Notes from other physicians involved in care were reviewed    Charting was completed using voice recognition technology and may include unintended errors.    SIDNEY MCNAIR MD, DELMIS.    Reinier Davey Master Clinician in Hematology and  Oncology  Medical Director, [facility] at [facility].  [place] office  Phone [phone]  Fax      [phone]  [facility] /[place].  Phone [phone]  Fax     [phone]

## 2024-10-25 NOTE — PROGRESS NOTES
G-CSF to continue Q 2 weeks at home.  Per patient she received 2 doses Q month.  States she has not received notification that PA is needed at this time.  Follow-up in 3 months with CBC same day.  Call back instructions reviewed.  Patient verbalized understanding.

## 2025-02-04 ENCOUNTER — APPOINTMENT (OUTPATIENT)
Dept: HEMATOLOGY/ONCOLOGY | Facility: CLINIC | Age: 60
End: 2025-02-04
Payer: COMMERCIAL

## 2025-05-14 DIAGNOSIS — D70.0 CONGENITAL NEUTROPENIA (MULTI): ICD-10-CM

## 2025-05-20 RX ORDER — FILGRASTIM-AAFI 480 UG/.8ML
480 INJECTION, SOLUTION SUBCUTANEOUS
Qty: 0.8 ML | Refills: 11 | Status: SHIPPED | OUTPATIENT
Start: 2025-05-20 | End: 2026-05-20

## 2025-05-27 ENCOUNTER — OFFICE VISIT (OUTPATIENT)
Dept: HEMATOLOGY/ONCOLOGY | Facility: CLINIC | Age: 60
End: 2025-05-27
Payer: COMMERCIAL

## 2025-05-27 ENCOUNTER — LAB (OUTPATIENT)
Dept: LAB | Facility: CLINIC | Age: 60
End: 2025-05-27
Payer: COMMERCIAL

## 2025-05-27 VITALS
BODY MASS INDEX: 28.96 KG/M2 | HEART RATE: 80 BPM | WEIGHT: 184.53 LBS | DIASTOLIC BLOOD PRESSURE: 96 MMHG | SYSTOLIC BLOOD PRESSURE: 151 MMHG | TEMPERATURE: 96.6 F | OXYGEN SATURATION: 98 % | RESPIRATION RATE: 16 BRPM

## 2025-05-27 DIAGNOSIS — D70.0 CONGENITAL NEUTROPENIA (MULTI): ICD-10-CM

## 2025-05-27 LAB
BASOPHILS # BLD AUTO: 0.06 X10*3/UL (ref 0–0.1)
BASOPHILS NFR BLD AUTO: 2.5 %
EOSINOPHIL # BLD AUTO: 0.3 X10*3/UL (ref 0–0.7)
EOSINOPHIL NFR BLD AUTO: 12.7 %
ERYTHROCYTE [DISTWIDTH] IN BLOOD BY AUTOMATED COUNT: 12.9 % (ref 11.5–14.5)
HCT VFR BLD AUTO: 43 % (ref 36–46)
HGB BLD-MCNC: 14.7 G/DL (ref 12–16)
IMM GRANULOCYTES # BLD AUTO: 0 X10*3/UL (ref 0–0.7)
IMM GRANULOCYTES NFR BLD AUTO: 0 % (ref 0–0.9)
LYMPHOCYTES # BLD AUTO: 0.8 X10*3/UL (ref 1.2–4.8)
LYMPHOCYTES NFR BLD AUTO: 33.8 %
MCH RBC QN AUTO: 37.8 PG (ref 26–34)
MCHC RBC AUTO-ENTMCNC: 34.2 G/DL (ref 32–36)
MCV RBC AUTO: 111 FL (ref 80–100)
MONOCYTES # BLD AUTO: 0.39 X10*3/UL (ref 0.1–1)
MONOCYTES NFR BLD AUTO: 16.5 %
NEUTROPHILS # BLD AUTO: 0.82 X10*3/UL (ref 1.2–7.7)
NEUTROPHILS NFR BLD AUTO: 34.5 %
NRBC BLD-RTO: ABNORMAL /100{WBCS}
PLATELET # BLD AUTO: 251 X10*3/UL (ref 150–450)
RBC # BLD AUTO: 3.89 X10*6/UL (ref 4–5.2)
WBC # BLD AUTO: 2.4 X10*3/UL (ref 4.4–11.3)

## 2025-05-27 PROCEDURE — 3080F DIAST BP >= 90 MM HG: CPT | Performed by: INTERNAL MEDICINE

## 2025-05-27 PROCEDURE — 36415 COLL VENOUS BLD VENIPUNCTURE: CPT

## 2025-05-27 PROCEDURE — 3077F SYST BP >= 140 MM HG: CPT | Performed by: INTERNAL MEDICINE

## 2025-05-27 PROCEDURE — 85025 COMPLETE CBC W/AUTO DIFF WBC: CPT

## 2025-05-27 PROCEDURE — 99214 OFFICE O/P EST MOD 30 MIN: CPT | Performed by: INTERNAL MEDICINE

## 2025-05-27 ASSESSMENT — PAIN SCALES - GENERAL: PAINLEVEL_OUTOF10: 0-NO PAIN

## 2025-05-27 NOTE — PROGRESS NOTES
Patient ID: Lorena Mojica is a 59 y.o. female.  Referring Physician: Joao Fisher MD  [address], [address]  [place] [address]  Primary Care Provider: Matt Collins DO    ORDERS & PATIENT INSTRUCTIONS:      G-CSF 480mcg SQ Q 2 wks /continue self injection, due tomorrow  RTC 12 wks  CBC,         ASSESSMENT, PROBLEM LIST, DECISION MAKING, PLAN.  1.  Chronic idiopathic neutropenia.       a.  Initial diagnosis in 2005. At that time her baseline ANC was around 300,   Extensive workup including bone marrow biopsy x2 all negative.At [facility], bone marrow biopsy results are not available    Patient was getting periodic pneumonia and other infections so she was started on Neupogen every 2 weeks and has remained stable  Patient was evaluated by Ricardo Garcia  at [facility] in November 2016 and has recommended repeat bone marrow aspirate and biopsy  b.  The patient is maintained on chronic Neupogen injection at 2-week interval.     PAST MEDICAL HISTORY:    1.  Chronic idiopathic neutropenia.  2.  Depression.  3.  Graves disease status post radioactive iodine ablation.  4.  Hypothyroidism due to radioactive iodine ablation.  5.  Hypertension.  6.  History of tubal ligation and eye surgeries.         INTERVAL HISTORY:    Patient returns today for follow-up on chronic neutropenia either idiopathic versus related to radiation therapy for hyperthyroidism   Patient was getting periodic pneumonia so she has been started on Neupogen every 2 weeks and has been doing fairly well   Patient has not had any recent infection      PHYSICAL EXAMINATION:    General: Conscious, alert, oriented x3, not in acute distress.  HEENT:    No icterus.    Chest:Bilateral symmetrical,     CVS:  S1, S2.    Abdomen:  Soft, no palpable mass   Extremities: No clubbing, cyanosis,     Skin: No petechial rash.                Assessment, Plan and Orders:       1.  Chronic idiopathic neutropenia.  "  Patient has been on 480 mcg of Neupogen at 2 weeks' interval. 2 attempts at stopping Neupogen Or decreasing frequency over the years have resulted in severe drop in ANC and clinical problem with pneumonia and other infections.     Continue G-CSF  every 2 weeks, self injection, she is due to receive her Granix shot tomorrow.      pt was adv to call before any invasive procedure such as dental work, colonoscopy or any other procedure and will get additional neupogen to make sure her ANC is >1000    Patient is up-to-date on her preventive vaccination including pneumonia shot shingles shots, flu shot, COVID shots and has been followed by primary care physician for periodic vaccination.        Patient has not had any recent hospital admission or any major infections            VITALS:   1.99 meters squared BP (!) 151/96   Pulse 80   Temp 35.9 °C (96.6 °F)   Resp 16   Wt 83.7 kg (184 lb 8.4 oz)   SpO2 98%   BMI 28.96 kg/m²     LABS:    CBC:  Recent Labs     05/27/25  1019 10/25/24  0937 08/16/24  0905 03/05/24  1335 11/28/23  1508   WBC 2.4* 3.7* 4.7 5.3 3.5*   HGB 14.7 15.9 15.1 14.8 15.3   HCT 43.0 45.7 44.9 43.2 45.4    233 215 245 279   * 108* 112* 108* 107*       CMP:  Recent Labs     08/25/20  1000 04/17/18  1000 01/16/18  1300 10/10/17  1310   * 141 139 140   K 4.3 4.2 4.2 4.2    106 106 108*   CO2 22 25 25 23   ANIONGAP 15 14 12 13   BUN 10 6 15 14   CREATININE 0.77 0.75 0.90 0.85     Recent Labs     08/25/20  1000 04/17/18  1000 01/16/18  1300 10/10/17  1310   ALBUMIN 4.0 4.1 4.5 4.3   ALKPHOS 63 74 65 65   ALT 34 26 16 13   AST 36 31 19 19   BILITOT 0.6 0.6 0.8 0.6       HEME/ENDO:No results for input(s): \"FERRITIN\", \"IRONSAT\", \"TSH\", \"UXPPOXDE23\", \"FOLATE\" in the last 98055 hours.     MICRO: No results for input(s): \"ESR\", \"CRP\", \"PROCAL\" in the last 22394 hours.  No results found for the last 90 days.        TUMOR MARKERS:  No results found for: \"LABCA2\", \"CEA\", \"\", " "\"PSA\", \"AFPTM\", \"HCGTM\", \"\"             IMAGING:         No image results found.       Current Outpatient Medications   Medication Sig Dispense Refill    albuterol 90 mcg/actuation inhaler       filgrastim-aafi (Nivestym) 480 mcg/0.8 mL Inject 0.8 mL (480 mcg) under the skin every 14 (fourteen) days. 0.8 mL 11    Flowflex COVID-19 Ag Home Test kit       nebivolol (Bystolic) 10 mg tablet       Paxlovid 300 mg (150 mg x 2)-100 mg tablet therapy pack TAKE 2 TABLETS (NIRMATRELVIR) AND TAKE 1 TABLET (RITONAVIR) BY MOUTH TWICE A DAY FOR 5 DAYS      Synthroid 137 mcg tablet       tbo-filgrastim (Granix) 480 mcg/0.8 mL injection Inject 0.8 mL (480 mcg) under the skin every 14 (fourteen) days. Once every 2 weeks 6 mL 3     No current facility-administered medications for this visit.                  SOCIAL HISTORY:    has no history on file for alcohol use.   has no history on file for drug use.,   Tobacco Use: Low Risk  (5/21/2024)    Received from [facility]    Patient History     Smoking Tobacco Use: Never     Smokeless Tobacco Use: Never     Passive Exposure: Not on file       FAMILY HISTORY:  No family history on file.    ALLERGY:   Patient has no known allergies.        SOCIAL HISTORY:    The patient and her  have been  for 30 years and live in [place], [place].  Nonsmoker.  Rare social alcohol intake.  She is currently on disability.  She used to work as a .       FAMILY  HISTORY:    Father aged 83 has heart problems.  Mother aged 79 is a pancreatic cancer survivor.  3 siblings, 2 children, 2 sons all alive and well.        Medication reviewed in e-chart  Patient is monitored for medication toxicity  labs reviewed and interpreted independently, X rays independently reviewed  Notes from other physicians involved in care were reviewed    Charting was completed using voice recognition technology and may include unintended errors.    SIDNEY MCNAIR MD, DELMIS.    Reinier" SABIHA Davey Master Clinician in Hematology and Oncology  Medical Director, [facility] at [facility].  [place] office  Phone [phone]  Fax      [phone]  [facility] /[place].  Phone [phone]  Fax     [phone]

## 2025-05-28 ENCOUNTER — TELEPHONE (OUTPATIENT)
Dept: HEMATOLOGY/ONCOLOGY | Facility: CLINIC | Age: 60
End: 2025-05-28
Payer: COMMERCIAL

## 2025-05-28 NOTE — TELEPHONE ENCOUNTER
Reason for Conversation  questions on RX    Background   Patient called uses [facility] and her shot Nivestym   She has questions on. Please call her @ [phone]      Disposition   No disposition on file.

## 2025-05-29 ENCOUNTER — TELEPHONE (OUTPATIENT)
Dept: HEMATOLOGY/ONCOLOGY | Facility: CLINIC | Age: 60
End: 2025-05-29
Payer: COMMERCIAL

## 2025-05-29 DIAGNOSIS — D70.0 CONGENITAL NEUTROPENIA (MULTI): ICD-10-CM

## 2025-05-29 NOTE — TELEPHONE ENCOUNTER
"Pt called and needs 5/20/25 Rx: Nivestym order for [facility], canceled and changed to \"6 refills of two doses each\" as this saves her money.    Thank you       Refill request forwarded to provider to send updated RX to [facility]    "

## 2025-05-29 NOTE — TELEPHONE ENCOUNTER
"Call returned to pt who states she would like the Nivestym order for [facility] canceled and changed to read \"6 refills of two doses each \",  as this will save her money. Pt. States she has enough of this Rx at the moment until she receives the new shipment from [facility]. Message sent via [employer] to Dr Fisher and nurse partner Juan KAISER to assist pt. request.  "

## 2025-05-30 DIAGNOSIS — D70.0 CONGENITAL NEUTROPENIA (MULTI): ICD-10-CM

## 2025-05-30 RX ORDER — FILGRASTIM-AAFI 480 UG/.8ML
480 INJECTION, SOLUTION SUBCUTANEOUS
Qty: 0.8 ML | Refills: 11 | Status: SHIPPED | OUTPATIENT
Start: 2025-05-30 | End: 2026-05-30

## 2025-05-30 RX ORDER — FILGRASTIM-AAFI 480 UG/.8ML
480 INJECTION, SOLUTION SUBCUTANEOUS
Qty: 0.8 ML | Refills: 6 | Status: SHIPPED | OUTPATIENT
Start: 2025-05-30 | End: 2025-09-05

## 2025-06-05 ENCOUNTER — TELEPHONE (OUTPATIENT)
Dept: HEMATOLOGY/ONCOLOGY | Facility: CLINIC | Age: 60
End: 2025-06-05
Payer: COMMERCIAL

## 2025-06-05 DIAGNOSIS — D70.0 CONGENITAL NEUTROPENIA (MULTI): ICD-10-CM

## 2025-06-05 NOTE — TELEPHONE ENCOUNTER
"Call returned and spoke with pt who is requesting that the quantity of Nivestym be change to \"2 per delivery\" with [facility]. This will cover her for one month. Sent request to Dr Fisher to change and re-submit to [facility]. Pt appreciative of the assistance.  "

## 2025-06-06 ENCOUNTER — TELEPHONE (OUTPATIENT)
Dept: HEMATOLOGY/ONCOLOGY | Facility: CLINIC | Age: 60
End: 2025-06-06
Payer: COMMERCIAL

## 2025-06-06 RX ORDER — FILGRASTIM-AAFI 480 UG/.8ML
480 INJECTION, SOLUTION SUBCUTANEOUS
Qty: 1.6 ML | Refills: 6 | Status: SHIPPED | OUTPATIENT
Start: 2025-06-06 | End: 2025-12-19

## 2025-06-06 NOTE — TELEPHONE ENCOUNTER
Phone call placed to [facility] pharmacy to attempt to get RX. Nivestym prescribed as 1 month supply and dispensed as 2 syringes. Spoke with pharmacist Berenice VEGA who states she is able to modify the prescription in the manner requested and has made the adjustments accordingly.

## 2025-08-19 ENCOUNTER — LAB (OUTPATIENT)
Dept: LAB | Facility: CLINIC | Age: 60
End: 2025-08-19
Payer: COMMERCIAL

## 2025-08-19 ENCOUNTER — OFFICE VISIT (OUTPATIENT)
Dept: HEMATOLOGY/ONCOLOGY | Facility: CLINIC | Age: 60
End: 2025-08-19
Payer: COMMERCIAL

## 2025-08-19 VITALS
HEART RATE: 84 BPM | SYSTOLIC BLOOD PRESSURE: 141 MMHG | TEMPERATURE: 97.7 F | DIASTOLIC BLOOD PRESSURE: 87 MMHG | BODY MASS INDEX: 29.14 KG/M2 | WEIGHT: 181.33 LBS | OXYGEN SATURATION: 97 % | RESPIRATION RATE: 16 BRPM | HEIGHT: 66 IN

## 2025-08-19 DIAGNOSIS — D70.0 CONGENITAL NEUTROPENIA (MULTI): ICD-10-CM

## 2025-08-19 LAB
BASOPHILS # BLD AUTO: 0.07 X10*3/UL (ref 0–0.1)
BASOPHILS NFR BLD AUTO: 1.9 %
EOSINOPHIL # BLD AUTO: 0.22 X10*3/UL (ref 0–0.7)
EOSINOPHIL NFR BLD AUTO: 5.9 %
ERYTHROCYTE [DISTWIDTH] IN BLOOD BY AUTOMATED COUNT: 12.7 % (ref 11.5–14.5)
HCT VFR BLD AUTO: 43.6 % (ref 36–46)
HGB BLD-MCNC: 15.2 G/DL (ref 12–16)
IMM GRANULOCYTES # BLD AUTO: 0.01 X10*3/UL (ref 0–0.7)
IMM GRANULOCYTES NFR BLD AUTO: 0.3 % (ref 0–0.9)
LYMPHOCYTES # BLD AUTO: 1.07 X10*3/UL (ref 1.2–4.8)
LYMPHOCYTES NFR BLD AUTO: 28.6 %
MCH RBC QN AUTO: 38.1 PG (ref 26–34)
MCHC RBC AUTO-ENTMCNC: 34.9 G/DL (ref 32–36)
MCV RBC AUTO: 109 FL (ref 80–100)
MONOCYTES # BLD AUTO: 0.29 X10*3/UL (ref 0.1–1)
MONOCYTES NFR BLD AUTO: 7.8 %
NEUTROPHILS # BLD AUTO: 2.08 X10*3/UL (ref 1.2–7.7)
NEUTROPHILS NFR BLD AUTO: 55.5 %
NRBC BLD-RTO: ABNORMAL /100{WBCS}
PLATELET # BLD AUTO: 228 X10*3/UL (ref 150–450)
RBC # BLD AUTO: 3.99 X10*6/UL (ref 4–5.2)
WBC # BLD AUTO: 3.7 X10*3/UL (ref 4.4–11.3)

## 2025-08-19 PROCEDURE — 99214 OFFICE O/P EST MOD 30 MIN: CPT | Performed by: INTERNAL MEDICINE

## 2025-08-19 PROCEDURE — 3079F DIAST BP 80-89 MM HG: CPT | Performed by: INTERNAL MEDICINE

## 2025-08-19 PROCEDURE — 36415 COLL VENOUS BLD VENIPUNCTURE: CPT

## 2025-08-19 PROCEDURE — 85025 COMPLETE CBC W/AUTO DIFF WBC: CPT

## 2025-08-19 PROCEDURE — 3008F BODY MASS INDEX DOCD: CPT | Performed by: INTERNAL MEDICINE

## 2025-08-19 PROCEDURE — 3077F SYST BP >= 140 MM HG: CPT | Performed by: INTERNAL MEDICINE

## 2025-08-19 ASSESSMENT — PAIN SCALES - GENERAL: PAINLEVEL_OUTOF10: 0-NO PAIN
